# Patient Record
Sex: MALE | Race: WHITE | NOT HISPANIC OR LATINO | Employment: OTHER | URBAN - METROPOLITAN AREA
[De-identification: names, ages, dates, MRNs, and addresses within clinical notes are randomized per-mention and may not be internally consistent; named-entity substitution may affect disease eponyms.]

---

## 2018-10-19 NOTE — PATIENT DISCUSSION
trace cystic changes OD but no visible OS, rec start Durezol BID and Bromsite BID and RTC x 1 month.  Will rec gtts OU due to upcoming YAG.

## 2018-11-20 NOTE — PATIENT DISCUSSION
Resolved. No treatment needed at this point. Recommend Observation. Advised pt to call with any vision changes.

## 2019-12-05 ENCOUNTER — NEW PATIENT COMPREHENSIVE (OUTPATIENT)
Dept: URBAN - METROPOLITAN AREA CLINIC 38 | Facility: CLINIC | Age: 74
End: 2019-12-05

## 2019-12-05 DIAGNOSIS — H43.812: ICD-10-CM

## 2019-12-05 DIAGNOSIS — H25.813: ICD-10-CM

## 2019-12-05 PROCEDURE — 92004 COMPRE OPH EXAM NEW PT 1/>: CPT

## 2019-12-05 PROCEDURE — 92015 DETERMINE REFRACTIVE STATE: CPT

## 2019-12-05 ASSESSMENT — VISUAL ACUITY
OS_SC: 20/80
OD_SC: 20/80
OS_CC: J10
OS_SC: J12
OS_CC: 20/20-2
OD_CC: 20/20
OD_SC: J12
OD_CC: J8

## 2019-12-05 ASSESSMENT — TONOMETRY
OS_IOP_MMHG: 16
OD_IOP_MMHG: 16

## 2020-11-24 ENCOUNTER — ESTABLISHED COMPREHENSIVE EXAM (OUTPATIENT)
Dept: URBAN - METROPOLITAN AREA CLINIC 38 | Facility: CLINIC | Age: 75
End: 2020-11-24

## 2020-11-24 DIAGNOSIS — H52.202: ICD-10-CM

## 2020-11-24 DIAGNOSIS — H43.813: ICD-10-CM

## 2020-11-24 DIAGNOSIS — H52.13: ICD-10-CM

## 2020-11-24 DIAGNOSIS — H25.813: ICD-10-CM

## 2020-11-24 DIAGNOSIS — H52.4: ICD-10-CM

## 2020-11-24 PROCEDURE — 92015 DETERMINE REFRACTIVE STATE: CPT

## 2020-11-24 PROCEDURE — 92014 COMPRE OPH EXAM EST PT 1/>: CPT

## 2020-11-24 ASSESSMENT — TONOMETRY
OS_IOP_MMHG: 15
OD_IOP_MMHG: 14

## 2020-11-24 ASSESSMENT — VISUAL ACUITY
OD_CC: J4
OD_CC: 20/20-1
OS_CC: 20/20
OS_CC: J8

## 2021-01-11 ENCOUNTER — CATARACT CONSULT (OUTPATIENT)
Dept: URBAN - METROPOLITAN AREA CLINIC 39 | Facility: CLINIC | Age: 76
End: 2021-01-11

## 2021-01-11 DIAGNOSIS — H25.811: ICD-10-CM

## 2021-01-11 DIAGNOSIS — H04.123: ICD-10-CM

## 2021-01-11 DIAGNOSIS — H18.513: ICD-10-CM

## 2021-01-11 DIAGNOSIS — H43.813: ICD-10-CM

## 2021-01-11 DIAGNOSIS — H35.30: ICD-10-CM

## 2021-01-11 DIAGNOSIS — H25.812: ICD-10-CM

## 2021-01-11 PROCEDURE — 92014 COMPRE OPH EXAM EST PT 1/>: CPT

## 2021-01-11 PROCEDURE — 92025-2 CORNEAL TOPOGRAPHY, PT

## 2021-01-11 PROCEDURE — 92136TC INTERFEROMETRY - TECHNICAL COMPONENT

## 2021-01-11 PROCEDURE — V2799PMN IMPRIMIS PRED-MOXI-NEPAF 5ML

## 2021-01-11 PROCEDURE — 92134 CPTRZ OPH DX IMG PST SGM RTA: CPT

## 2021-01-11 PROCEDURE — 92286 ANT SGM IMG I&R SPECLR MIC: CPT

## 2021-01-11 ASSESSMENT — VISUAL ACUITY
OS_CC: 20/30-2
OD_CC: 20/40+2
OS_CC: J1
OD_BAT: 20/100
OD_SC: 20/200
OD_SC: J1
OS_BAT: 20/100
OS_SC: J1
OS_SC: 20/400
OS_AM: 20/20
OD_RAM: 20/20
OD_CC: J1

## 2021-01-11 ASSESSMENT — TONOMETRY
OD_IOP_MMHG: 14
OS_IOP_MMHG: 14

## 2021-02-17 ENCOUNTER — SURGERY/PROCEDURE (OUTPATIENT)
Dept: URBAN - METROPOLITAN AREA CLINIC 39 | Facility: CLINIC | Age: 76
End: 2021-02-17

## 2021-02-17 ENCOUNTER — PRE-OP/H&P (OUTPATIENT)
Dept: URBAN - METROPOLITAN AREA CLINIC 39 | Facility: CLINIC | Age: 76
End: 2021-02-17

## 2021-02-17 DIAGNOSIS — H25.811: ICD-10-CM

## 2021-02-17 PROCEDURE — 66984 XCAPSL CTRC RMVL W/O ECP: CPT

## 2021-02-17 PROCEDURE — 99211T TECH SERVICE

## 2021-02-18 ENCOUNTER — CATARACT POST-OP 1-DAY (OUTPATIENT)
Dept: URBAN - METROPOLITAN AREA CLINIC 38 | Facility: CLINIC | Age: 76
End: 2021-02-18

## 2021-02-18 DIAGNOSIS — H25.812: ICD-10-CM

## 2021-02-18 DIAGNOSIS — H25.811: ICD-10-CM

## 2021-02-18 DIAGNOSIS — Z96.1: ICD-10-CM

## 2021-02-18 PROCEDURE — 99024 POSTOP FOLLOW-UP VISIT: CPT

## 2021-02-18 ASSESSMENT — VISUAL ACUITY
OD_SC: 20/20
OS_CC: 20/30+2
OD_CC: J8
OS_SC: 20/200
OS_CC: J1
OU_SC: 20/20
OD_SC: J8
OU_SC: J1
OU_CC: J1
OS_SC: J1

## 2021-02-18 ASSESSMENT — KERATOMETRY
OS_AXISANGLE2_DEGREES: 130
OD_AXISANGLE2_DEGREES: 85
OD_K1POWER_DIOPTERS: 43.00
OD_AXISANGLE_DEGREES: 175
OD_K2POWER_DIOPTERS: 42.00
OS_K2POWER_DIOPTERS: 43.50
OS_AXISANGLE_DEGREES: 40
OS_K1POWER_DIOPTERS: 43.00

## 2021-02-18 ASSESSMENT — TONOMETRY
OS_IOP_MMHG: 17
OD_IOP_MMHG: 16

## 2021-02-22 NOTE — PATIENT DISCUSSION
[place]ler grid at home. MVI/AREDS discussed. Patient to call if any changes in vision or grid card.
Discussed the risks/benefits of laser capsulotomy. Laser recommended. Patient elects to proceed.
Indications, risks, benefits and alternatives to YAG capsulotomy discussed with patient. Questions answered. Educational handout given.
Monitor.
No retinal holes, tears, or detachment at this time.
RD/tear warning symptoms reviewed. Call immediately if increase in floaters, flashes, veil, blur.
Reassured patient.
Recommended observation.
Resolved. No treatment needed at this point. Recommend Observation. Advised pt to call with any vision changes.
Stable.
Well healed.
reviewed with pt  chart reviewed

## 2021-02-24 ENCOUNTER — PRE-OP/H&P (OUTPATIENT)
Dept: URBAN - METROPOLITAN AREA CLINIC 39 | Facility: CLINIC | Age: 76
End: 2021-02-24

## 2021-02-24 ENCOUNTER — SURGERY/PROCEDURE (OUTPATIENT)
Dept: URBAN - METROPOLITAN AREA CLINIC 39 | Facility: CLINIC | Age: 76
End: 2021-02-24

## 2021-02-24 DIAGNOSIS — H25.812: ICD-10-CM

## 2021-02-24 DIAGNOSIS — Z96.1: ICD-10-CM

## 2021-02-24 DIAGNOSIS — H25.811: ICD-10-CM

## 2021-02-24 PROCEDURE — 99211T TECH SERVICE

## 2021-02-24 PROCEDURE — 66984 XCAPSL CTRC RMVL W/O ECP: CPT

## 2021-02-24 ASSESSMENT — TONOMETRY
OS_IOP_MMHG: 15
OD_IOP_MMHG: 15

## 2021-02-24 ASSESSMENT — VISUAL ACUITY
OS_SC: 20/400
OD_SC: J12
OS_SC: J1
OD_SC: 20/25

## 2021-02-25 ENCOUNTER — CATARACT POST-OP 1-DAY (OUTPATIENT)
Dept: URBAN - METROPOLITAN AREA CLINIC 38 | Facility: CLINIC | Age: 76
End: 2021-02-25

## 2021-02-25 DIAGNOSIS — H25.811: ICD-10-CM

## 2021-02-25 DIAGNOSIS — Z96.1: ICD-10-CM

## 2021-02-25 PROCEDURE — 99024 POSTOP FOLLOW-UP VISIT: CPT

## 2021-02-25 ASSESSMENT — VISUAL ACUITY
OS_SC: 20/20
OS_SC: J12
OD_SC: 20/20
OD_SC: J12

## 2021-02-25 ASSESSMENT — TONOMETRY
OD_IOP_MMHG: 12
OD_IOP_MMHG: 20
OS_IOP_MMHG: 22
OS_IOP_MMHG: 17

## 2021-03-26 ENCOUNTER — POST-OP CATARACT (OUTPATIENT)
Dept: URBAN - METROPOLITAN AREA CLINIC 38 | Facility: CLINIC | Age: 76
End: 2021-03-26

## 2021-03-26 DIAGNOSIS — Z96.1: ICD-10-CM

## 2021-03-26 PROCEDURE — 99024 POSTOP FOLLOW-UP VISIT: CPT

## 2021-03-26 ASSESSMENT — KERATOMETRY
OS_K2POWER_DIOPTERS: 43.25
OS_K1POWER_DIOPTERS: 42.75
OD_K2POWER_DIOPTERS: 42.50
OS_AXISANGLE2_DEGREES: 105
OD_AXISANGLE_DEGREES: 150
OD_K1POWER_DIOPTERS: 41.75
OS_AXISANGLE_DEGREES: 15
OD_AXISANGLE2_DEGREES: 60

## 2021-03-26 ASSESSMENT — VISUAL ACUITY
OD_CC: J1
OD_SC: 20/20
OU_CC: J1
OS_CC: J1
OU_SC: 20/20
OS_SC: 20/20

## 2021-03-26 ASSESSMENT — TONOMETRY
OS_IOP_MMHG: 11
OD_IOP_MMHG: 12

## 2021-07-19 ENCOUNTER — OFFICE VISIT (OUTPATIENT)
Dept: FAMILY MEDICINE | Facility: CLINIC | Age: 76
End: 2021-07-19
Payer: COMMERCIAL

## 2021-07-19 VITALS
HEIGHT: 67 IN | OXYGEN SATURATION: 96 % | WEIGHT: 187.5 LBS | HEART RATE: 71 BPM | RESPIRATION RATE: 18 BRPM | DIASTOLIC BLOOD PRESSURE: 69 MMHG | TEMPERATURE: 98.6 F | SYSTOLIC BLOOD PRESSURE: 133 MMHG | BODY MASS INDEX: 29.43 KG/M2

## 2021-07-19 DIAGNOSIS — T14.8XXA HEMATOMA OF SKIN: Primary | ICD-10-CM

## 2021-07-19 DIAGNOSIS — S89.92XD INJURY OF LEFT LOWER EXTREMITY, SUBSEQUENT ENCOUNTER: ICD-10-CM

## 2021-07-19 RX ORDER — MELOXICAM 7.5 MG/1
7.5 TABLET ORAL
COMMUNITY

## 2021-07-19 RX ORDER — ASPIRIN 81 MG/1
81 TABLET ORAL DAILY
COMMUNITY

## 2021-07-19 RX ORDER — OXYCODONE AND ACETAMINOPHEN 5; 325 MG/1; MG/1
1 TABLET ORAL EVERY 6 HOURS PRN
COMMUNITY

## 2021-07-19 RX ORDER — IBUPROFEN 200 MG
200 TABLET ORAL EVERY 4 HOURS PRN
COMMUNITY

## 2021-07-19 RX ORDER — ACETAMINOPHEN 325 MG/1
325-650 TABLET ORAL EVERY 6 HOURS PRN
COMMUNITY

## 2021-07-19 RX ORDER — ATORVASTATIN CALCIUM 20 MG/1
20 TABLET, FILM COATED ORAL DAILY
COMMUNITY

## 2021-07-19 RX ORDER — NAPROXEN 500 MG/1
1000 TABLET ORAL 2 TIMES DAILY WITH MEALS
COMMUNITY

## 2021-07-19 ASSESSMENT — MIFFLIN-ST. JEOR: SCORE: 1547.53

## 2021-07-19 ASSESSMENT — PAIN SCALES - GENERAL: PAINLEVEL: EXTREME PAIN (8)

## 2021-07-19 NOTE — NURSING NOTE
"ROOM:2    Preferred Name: Jarad     75 year old  Chief Complaint   Patient presents with     RECHECK     Follow up from ED visit in [place]. Was waterskiing and left leg made popping sound as he was trying to get up.  Diffuse bruising on left thigh.  Told to have MRI.       Blood pressure 133/69, pulse 71, temperature 98.6  F (37  C), temperature source Oral, resp. rate 18, height 1.699 m (5' 6.9\"), weight 85 kg (187 lb 8 oz), SpO2 96 %. Body mass index is 29.45 kg/m .  BP completed using cuff size:    There is no problem list on file for this patient.      Wt Readings from Last 2 Encounters:   07/19/21 85 kg (187 lb 8 oz)     BP Readings from Last 3 Encounters:   07/19/21 133/69       Allergies   Allergen Reactions     Codeine Rash       Current Outpatient Medications   Medication     aspirin 81 MG EC tablet     atorvastatin (LIPITOR) 20 MG tablet     meloxicam (MOBIC) 7.5 MG tablet     naproxen (NAPROSYN) 500 MG tablet     oxyCODONE-acetaminophen (PERCOCET) 5-325 MG tablet     No current facility-administered medications for this visit.       Social History     Tobacco Use     Smoking status: Never Smoker     Smokeless tobacco: Never Used   Substance Use Topics     Alcohol use: Yes     Comment: Socially     Drug use: Never       Honoring Choices - Health Care Directive Guide offered to patient at time of visit.    Health Maintenance Due   Topic Date Due     ADVANCE CARE PLANNING  Never done     COLORECTAL CANCER SCREENING  Never done     HEPATITIS C SCREENING  Never done     DTAP/TDAP/TD IMMUNIZATION (1 - Tdap) Never done     LIPID  Never done     ZOSTER IMMUNIZATION (1 of 2) Never done     MEDICARE ANNUAL WELLNESS VISIT  Never done     FALL RISK ASSESSMENT  Never done     Pneumococcal Vaccine: 65+ Years (1 of 1 - PPSV23) Never done     AORTIC ANEURYSM SCREENING (SYSTEM ASSIGNED)  Never done     PHQ-2  Never done     COVID-19 Vaccine (2 - Moderna 2-dose series) 02/26/2021         There is no immunization " history on file for this patient.    No results found for: PAP    No lab results found.    No flowsheet data found.    No flowsheet data found.    No flowsheet data found.    No flowsheet data found.    Kae Daniel RN    July 19, 2021 1:12 PM

## 2021-07-19 NOTE — PROGRESS NOTES
Jarad Kamara is a 75 year old male, accompanied by his wife Louise, who presents today for an emergency room follow up due to a skiing accident.  Jarad and his wife are from [place], [place], they are supposed to fly to [place] in 2 days and are concerned about his leg.  He was seen at a hospital in [place], [place] where they did xrays and said he should follow up with an MRI.  On Thursday, 4 days ago, he attempted to get up in the water to water ski.  His left leg stretch out wide to the left from the water pressure, he felt a popping sound and felt some pain.  He did not attempt further to get up skiing.  He felt some discomfort in his groin but now what is most uncomfortable is the large bruising in the left posterior thigh and it is very painful to sit on.  He has taken motrin for pain and is on 81 mg of aspirin per day.     He has no other symptoms, no shortness of breath or cough.  He is able to stand without discomfort.  He is in general good health.      Review Of Systems   ROS: 10 point ROS neg other than the symptoms noted above in the HPI.    No past medical history on file.  No past surgical history on file.  Social History     Socioeconomic History     Marital status:      Spouse name: Not on file     Number of children: Not on file     Years of education: Not on file     Highest education level: Not on file   Occupational History     Not on file   Tobacco Use     Smoking status: Never Smoker     Smokeless tobacco: Never Used   Substance and Sexual Activity     Alcohol use: Yes     Comment: Socially     Drug use: Never     Sexual activity: Not on file   Other Topics Concern     Not on file   Social History Narrative     Not on file     Social Determinants of Health     Financial Resource Strain:      Difficulty of Paying Living Expenses:    Food Insecurity:      Worried About Running Out of Food in the Last Year:      Ran Out of Food in the Last Year:    Transportation Needs:      Lack of  "Transportation (Medical):      Lack of Transportation (Non-Medical):    Physical Activity:      Days of Exercise per Week:      Minutes of Exercise per Session:    Stress:      Feeling of Stress :    Social Connections:      Frequency of Communication with Friends and Family:      Frequency of Social Gatherings with Friends and Family:      Attends Holiness Services:      Active Member of Clubs or Organizations:      Attends Club or Organization Meetings:      Marital Status:    Intimate Partner Violence:      Fear of Current or Ex-Partner:      Emotionally Abused:      Physically Abused:      Sexually Abused:      No family history on file.    /69 (BP Location: Left arm, Patient Position: Sitting, Cuff Size: Adult Regular)   Pulse 71   Temp 98.6  F (37  C) (Oral)   Resp 18   Ht 1.699 m (5' 6.9\")   Wt 85 kg (187 lb 8 oz)   SpO2 96%   BMI 29.45 kg/m      Exam:  Constitutional: healthy, alert and mild distress  Cardiovascular: negative, PMI normal. No lifts, heaves, or thrills. RRR. No murmurs, clicks gallops or rub  Respiratory: negative, Percussion normal. Good diaphragmatic excursion. Lungs clear  Musculoskeletal: posterior left thigh- large purple hematoma encompasses the whole upper thigh, bruising goes part way on his inner leg around to the front, the bleeding also has gone the back of his left leg into his calf. The calf is taut.  The skin seems warm to the touch, but the right leg feels the same.   Psychiatric: mentation appears normal and affect normal/bright    Assessment/Plan:    1. Hematoma of skin      2. Injury of left lower extremity, subsequent encounter    Stop aspirin for now.  Follow up with orthopedist for MRI- sent to [facility] as [facility] orth[facility] would not see him today.      Options for treatment and follow-up care were reviewed with the patient. Patient engaged in the decision making process and verbalized understanding of the options discussed and agreed with the final plan.    "

## 2021-09-27 ENCOUNTER — ESTABLISHED COMPREHENSIVE EXAM (OUTPATIENT)
Dept: URBAN - METROPOLITAN AREA CLINIC 38 | Facility: CLINIC | Age: 76
End: 2021-09-27

## 2021-09-27 DIAGNOSIS — H52.13: ICD-10-CM

## 2021-09-27 DIAGNOSIS — Z96.1: ICD-10-CM

## 2021-09-27 DIAGNOSIS — H52.4: ICD-10-CM

## 2021-09-27 DIAGNOSIS — H26.493: ICD-10-CM

## 2021-09-27 DIAGNOSIS — H25.811: ICD-10-CM

## 2021-09-27 PROCEDURE — 92014 COMPRE OPH EXAM EST PT 1/>: CPT

## 2021-09-27 PROCEDURE — 92015 DETERMINE REFRACTIVE STATE: CPT

## 2021-09-27 ASSESSMENT — VISUAL ACUITY
OU_SC: 20/20
OS_CC: J1
OS_SC: 20/20
OD_CC: J1
OD_SC: 20/20
OU_CC: J1

## 2021-09-27 ASSESSMENT — KERATOMETRY
OS_AXISANGLE_DEGREES: 15
OD_K1POWER_DIOPTERS: 41.75
OS_K1POWER_DIOPTERS: 42.75
OD_K2POWER_DIOPTERS: 42.50
OD_AXISANGLE2_DEGREES: 60
OS_AXISANGLE2_DEGREES: 105
OD_AXISANGLE_DEGREES: 150
OS_K2POWER_DIOPTERS: 43.25

## 2021-09-27 ASSESSMENT — TONOMETRY
OS_IOP_MMHG: 12
OD_IOP_MMHG: 12

## 2022-10-10 ENCOUNTER — COMPREHENSIVE EXAM (OUTPATIENT)
Dept: URBAN - METROPOLITAN AREA CLINIC 38 | Facility: CLINIC | Age: 77
End: 2022-10-10

## 2022-10-10 DIAGNOSIS — H52.13: ICD-10-CM

## 2022-10-10 DIAGNOSIS — Z83.518: ICD-10-CM

## 2022-10-10 DIAGNOSIS — H26.493: ICD-10-CM

## 2022-10-10 DIAGNOSIS — Z96.1: ICD-10-CM

## 2022-10-10 DIAGNOSIS — H35.30: ICD-10-CM

## 2022-10-10 DIAGNOSIS — H04.123: ICD-10-CM

## 2022-10-10 DIAGNOSIS — H25.811: ICD-10-CM

## 2022-10-10 PROCEDURE — 92014 COMPRE OPH EXAM EST PT 1/>: CPT

## 2022-10-10 PROCEDURE — 92015 DETERMINE REFRACTIVE STATE: CPT

## 2022-10-10 ASSESSMENT — TONOMETRY
OD_IOP_MMHG: 15
OS_IOP_MMHG: 15

## 2022-10-10 ASSESSMENT — KERATOMETRY
OS_AXISANGLE_DEGREES: 15
OD_AXISANGLE2_DEGREES: 60
OS_AXISANGLE2_DEGREES: 105
OS_K2POWER_DIOPTERS: 43.25
OD_K1POWER_DIOPTERS: 41.75
OD_K2POWER_DIOPTERS: 42.50
OS_K1POWER_DIOPTERS: 42.75
OD_AXISANGLE_DEGREES: 150

## 2022-10-10 ASSESSMENT — VISUAL ACUITY
OD_SC: 20/20
OD_CC: J2-
OD_BAT: 20/200
OS_SC: 20/20
OS_CC: J2
OU_CC: J2
OU_SC: 20/20
OS_BAT: 20/100

## 2022-11-28 ASSESSMENT — KERATOMETRY
OS_AXISANGLE2_DEGREES: 105
OD_AXISANGLE2_DEGREES: 60
OD_AXISANGLE_DEGREES: 150
OS_K1POWER_DIOPTERS: 42.75
OS_K2POWER_DIOPTERS: 43.25
OD_K2POWER_DIOPTERS: 42.50
OS_AXISANGLE_DEGREES: 15
OD_K1POWER_DIOPTERS: 41.75

## 2022-11-30 ENCOUNTER — SURGERY/PROCEDURE (OUTPATIENT)
Dept: URBAN - METROPOLITAN AREA SURGERY 14 | Facility: SURGERY | Age: 77
End: 2022-11-30

## 2022-11-30 ENCOUNTER — CONSULTATION/EVALUATION (OUTPATIENT)
Dept: URBAN - METROPOLITAN AREA CLINIC 39 | Facility: CLINIC | Age: 77
End: 2022-11-30

## 2022-11-30 DIAGNOSIS — H26.493: ICD-10-CM

## 2022-11-30 DIAGNOSIS — H04.123: ICD-10-CM

## 2022-11-30 DIAGNOSIS — H35.30: ICD-10-CM

## 2022-11-30 DIAGNOSIS — Z96.1: ICD-10-CM

## 2022-11-30 DIAGNOSIS — H25.811: ICD-10-CM

## 2022-11-30 DIAGNOSIS — Z83.518: ICD-10-CM

## 2022-11-30 PROCEDURE — 92014 COMPRE OPH EXAM EST PT 1/>: CPT

## 2022-11-30 PROCEDURE — 6682150 YAG CAPSULOTOMY

## 2022-11-30 ASSESSMENT — TONOMETRY
OS_IOP_MMHG: 13
OD_IOP_MMHG: 14

## 2022-11-30 ASSESSMENT — VISUAL ACUITY
OS_SC: 20/20
OD_SC: 20/20
OS_BAT: 20/100
OD_BAT: 20/100

## 2022-12-05 ASSESSMENT — KERATOMETRY
OD_K1POWER_DIOPTERS: 41.75
OD_AXISANGLE2_DEGREES: 60
OD_AXISANGLE_DEGREES: 150
OS_AXISANGLE_DEGREES: 15
OS_K2POWER_DIOPTERS: 43.25
OS_K1POWER_DIOPTERS: 42.75
OS_AXISANGLE2_DEGREES: 105
OD_K2POWER_DIOPTERS: 42.50

## 2022-12-08 ENCOUNTER — POST-OP (OUTPATIENT)
Dept: URBAN - METROPOLITAN AREA CLINIC 38 | Facility: CLINIC | Age: 77
End: 2022-12-08

## 2022-12-08 PROCEDURE — 99024 POSTOP FOLLOW-UP VISIT: CPT

## 2022-12-08 ASSESSMENT — VISUAL ACUITY
OS_CC: J1
OS_SC: 20/20-2
OD_SC: 20/20
OU_CC: J1
OD_SC: J12
OS_SC: J12
OU_SC: J12
OD_CC: J1
OU_SC: 20/20-1

## 2022-12-08 ASSESSMENT — KERATOMETRY
OS_AXISANGLE_DEGREES: 15
OD_AXISANGLE_DEGREES: 150
OS_K1POWER_DIOPTERS: 42.75
OS_AXISANGLE2_DEGREES: 105
OD_K2POWER_DIOPTERS: 42.50
OD_AXISANGLE2_DEGREES: 60
OS_K2POWER_DIOPTERS: 43.25
OD_K1POWER_DIOPTERS: 41.75

## 2022-12-08 ASSESSMENT — TONOMETRY
OD_IOP_MMHG: 12
OS_IOP_MMHG: 13

## 2023-06-27 ENCOUNTER — COMPREHENSIVE EXAM (OUTPATIENT)
Dept: URBAN - METROPOLITAN AREA CLINIC 38 | Facility: CLINIC | Age: 78
End: 2023-06-27

## 2023-06-27 DIAGNOSIS — H26.493: ICD-10-CM

## 2023-06-27 DIAGNOSIS — H52.13: ICD-10-CM

## 2023-06-27 DIAGNOSIS — H35.30: ICD-10-CM

## 2023-06-27 DIAGNOSIS — Z83.518: ICD-10-CM

## 2023-06-27 DIAGNOSIS — H25.811: ICD-10-CM

## 2023-06-27 DIAGNOSIS — Z96.1: ICD-10-CM

## 2023-06-27 DIAGNOSIS — Z98.890: ICD-10-CM

## 2023-06-27 DIAGNOSIS — H04.123: ICD-10-CM

## 2023-06-27 PROCEDURE — 92014 COMPRE OPH EXAM EST PT 1/>: CPT

## 2023-06-27 PROCEDURE — 92015 DETERMINE REFRACTIVE STATE: CPT

## 2023-06-27 ASSESSMENT — VISUAL ACUITY
OS_CC: J2
OU_CC: J1
OS_SC: J12
OD_CC: J2
OD_SC: 20/20
OU_SC: J12
OD_SC: J12
OS_SC: 20/20

## 2023-06-27 ASSESSMENT — KERATOMETRY
OS_AXISANGLE_DEGREES: 15
OS_AXISANGLE2_DEGREES: 105
OS_K2POWER_DIOPTERS: 43.25
OD_AXISANGLE_DEGREES: 150
OD_AXISANGLE2_DEGREES: 60
OD_K1POWER_DIOPTERS: 41.75
OD_K2POWER_DIOPTERS: 42.50
OS_K1POWER_DIOPTERS: 42.75

## 2023-06-27 ASSESSMENT — TONOMETRY
OS_IOP_MMHG: 14
OD_IOP_MMHG: 14

## 2024-06-28 ENCOUNTER — COMPREHENSIVE EXAM (OUTPATIENT)
Dept: URBAN - METROPOLITAN AREA CLINIC 38 | Facility: CLINIC | Age: 79
End: 2024-06-28

## 2024-06-28 DIAGNOSIS — H35.30: ICD-10-CM

## 2024-06-28 DIAGNOSIS — Z83.518: ICD-10-CM

## 2024-06-28 DIAGNOSIS — H04.123: ICD-10-CM

## 2024-06-28 DIAGNOSIS — Z96.1: ICD-10-CM

## 2024-06-28 DIAGNOSIS — Z98.890: ICD-10-CM

## 2024-06-28 PROCEDURE — 92014 COMPRE OPH EXAM EST PT 1/>: CPT

## 2024-06-28 PROCEDURE — 92015 DETERMINE REFRACTIVE STATE: CPT

## 2024-06-28 ASSESSMENT — VISUAL ACUITY
OD_SC: 20/20-1
OU_CC: J1
OD_CC: J1
OS_CC: J1
OU_SC: 20/20
OS_SC: 20/20

## 2024-06-28 ASSESSMENT — TONOMETRY
OS_IOP_MMHG: 14
OD_IOP_MMHG: 15

## 2024-06-28 ASSESSMENT — KERATOMETRY
OS_AXISANGLE_DEGREES: 15
OD_AXISANGLE_DEGREES: 150
OD_AXISANGLE2_DEGREES: 60
OS_K1POWER_DIOPTERS: 42.75
OD_K1POWER_DIOPTERS: 41.75
OS_K2POWER_DIOPTERS: 43.25
OD_K2POWER_DIOPTERS: 42.50
OS_AXISANGLE2_DEGREES: 105

## 2024-08-08 NOTE — PATIENT DISCUSSION
Current patient location: [address] [facility] [address]    Is the patient currently in the state of [place]? YES    Visit mode:VIDEO    If the visit is dropped, the patient can be reconnected by: VIDEO VISIT: Send to e-mail at: [email or web address]    Will anyone else be joining the visit? NO  (If patient encounters technical issues they should call [phone]    How would you like to obtain your AVS? MyChart    Are changes needed to the allergy or medication list? Pt stated no changes to allergies and Pt stated no med changes    Are refills needed on medications prescribed by this physician? NO    Rooming Documentation:  Assigned questionnaire(s) completed      Reason for visit: Recheck    Madelyn CORRIGAN       RD/tear warning symptoms reviewed. Call immediately if increase in floaters, flashes, veil, blur.

## 2025-07-29 ENCOUNTER — COMPREHENSIVE EXAM (OUTPATIENT)
Age: 80
End: 2025-07-29

## 2025-07-29 DIAGNOSIS — H04.123: ICD-10-CM

## 2025-07-29 DIAGNOSIS — Z96.1: ICD-10-CM

## 2025-07-29 DIAGNOSIS — Z98.890: ICD-10-CM

## 2025-07-29 DIAGNOSIS — Z83.518: ICD-10-CM

## 2025-07-29 DIAGNOSIS — H35.30: ICD-10-CM

## 2025-07-29 PROCEDURE — 92015 DETERMINE REFRACTIVE STATE: CPT

## 2025-07-29 PROCEDURE — 92134 CPTRZ OPH DX IMG PST SGM RTA: CPT

## 2025-07-29 PROCEDURE — 92014 COMPRE OPH EXAM EST PT 1/>: CPT
